# Patient Record
Sex: FEMALE | Race: WHITE | NOT HISPANIC OR LATINO | Employment: OTHER | URBAN - METROPOLITAN AREA
[De-identification: names, ages, dates, MRNs, and addresses within clinical notes are randomized per-mention and may not be internally consistent; named-entity substitution may affect disease eponyms.]

---

## 2023-08-22 ENCOUNTER — HOSPITAL ENCOUNTER (OUTPATIENT)
Dept: DATA CONVERSION | Facility: HOSPITAL | Age: 88
Discharge: HOME | End: 2023-08-22
Payer: MEDICARE

## 2023-08-22 DIAGNOSIS — R82.71 BACTERIURIA: ICD-10-CM

## 2023-08-22 LAB
AMOXICILLIN+CLAV SUSC ISLT: NORMAL
AMPICILLIN SUSC ISLT: NORMAL
AMPICILLIN+SULBAC SUSC ISLT: NORMAL
BACTERIA ISLT: NORMAL
BACTERIA SPEC CULT: NORMAL
CC # UR: NORMAL /UL
CEFAZOLIN SUSC ISLT: NORMAL
CIPROFLOXACIN SUSC ISLT: NORMAL
GENTAMICIN ISLT MLC: NORMAL
LEVOFLOXACIN SUSC ISLT: NORMAL
MEROPENEM SUSC ISLT: NORMAL
MIC (SUSCEPTIBILITY): NORMAL
NITROFURANTOIN SUSC ISLT: NORMAL
PIP+TAZO SUSC ISLT: NORMAL
REPORT STATUS -LH SQ DATA CONVERSION: NORMAL
SERVICE CMNT-IMP: NORMAL
SPECIMEN SOURCE: NORMAL
TETRACYCLINE SUSC ISLT: NORMAL
TMP SMX SUSC ISLT: NORMAL

## 2023-09-01 PROBLEM — G62.9 NEUROPATHY: Status: ACTIVE | Noted: 2023-09-01

## 2023-09-01 PROBLEM — R15.9 FULL INCONTINENCE OF FECES: Status: ACTIVE | Noted: 2023-09-01

## 2023-09-01 PROBLEM — Z85.42 HISTORY OF CANCER OF UTERUS: Status: ACTIVE | Noted: 2023-09-01

## 2023-09-01 PROBLEM — R19.5 OCCULT BLOOD IN STOOLS: Status: ACTIVE | Noted: 2023-09-01

## 2023-09-01 PROBLEM — I48.91 ATRIAL FIBRILLATION (MULTI): Status: ACTIVE | Noted: 2023-09-01

## 2023-09-01 PROBLEM — G25.81 RESTLESS LEGS: Status: ACTIVE | Noted: 2023-09-01
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
## 2023-09-01 PROBLEM — M81.0 AGE-RELATED OSTEOPOROSIS WITHOUT CURRENT PATHOLOGICAL FRACTURE: Status: ACTIVE | Noted: 2023-09-01

## 2023-09-01 PROBLEM — M06.9 RHEUMATOID ARTHRITIS (MULTI): Status: ACTIVE | Noted: 2023-09-01

## 2023-09-01 PROBLEM — M48.061 LUMBAR STENOSIS: Status: ACTIVE | Noted: 2023-09-01

## 2023-09-01 PROBLEM — M35.3 POLYMYALGIA (MULTI): Status: ACTIVE | Noted: 2023-09-01

## 2023-09-01 PROBLEM — I07.1 MODERATE TRICUSPID REGURGITATION: Status: ACTIVE | Noted: 2023-09-01

## 2023-09-01 PROBLEM — N32.81 OAB (OVERACTIVE BLADDER): Status: ACTIVE | Noted: 2023-09-01

## 2023-09-01 PROBLEM — E78.5 HYPERLIPIDEMIA: Status: ACTIVE | Noted: 2023-09-01

## 2023-09-01 PROBLEM — S01.81XA FACIAL LACERATION: Status: ACTIVE | Noted: 2023-09-01

## 2023-09-01 PROBLEM — R10.9 ABDOMINAL PAIN: Status: ACTIVE | Noted: 2023-09-01

## 2023-09-01 PROBLEM — R40.1 CLOUDED CONSCIOUSNESS: Status: ACTIVE | Noted: 2023-09-01

## 2023-09-01 PROBLEM — I11.9 BENIGN HYPERTENSIVE HEART DISEASE: Status: ACTIVE | Noted: 2023-09-01

## 2023-09-01 PROBLEM — K57.90 DIVERTICULAR DISEASE: Status: ACTIVE | Noted: 2023-09-01

## 2023-09-01 PROBLEM — H90.3 SENSORINEURAL HEARING LOSS, BILATERAL: Status: ACTIVE | Noted: 2023-09-01

## 2023-09-01 PROBLEM — M19.90 OSTEOARTHRITIS: Status: ACTIVE | Noted: 2023-09-01

## 2023-09-01 PROBLEM — I34.0 MITRAL VALVE INSUFFICIENCY: Status: ACTIVE | Noted: 2023-09-01

## 2023-09-01 PROBLEM — I10 ESSENTIAL HYPERTENSION: Status: ACTIVE | Noted: 2023-09-01

## 2023-09-01 PROBLEM — K21.9 GERD (GASTROESOPHAGEAL REFLUX DISEASE): Status: ACTIVE | Noted: 2023-09-01

## 2023-09-01 PROBLEM — M81.0 OSTEOPOROSIS: Status: ACTIVE | Noted: 2023-09-01

## 2023-09-01 PROBLEM — F41.9 ANXIETY: Status: ACTIVE | Noted: 2023-09-01

## 2023-09-01 PROBLEM — I42.9 CARDIOMYOPATHY (MULTI): Status: ACTIVE | Noted: 2023-09-01

## 2023-09-01 RX ORDER — FAMOTIDINE 20 MG/1
20 TABLET, FILM COATED ORAL DAILY
COMMUNITY

## 2023-09-01 RX ORDER — HYDROCHLOROTHIAZIDE 25 MG/1
25 TABLET ORAL DAILY
COMMUNITY
Start: 2022-02-15

## 2023-09-01 RX ORDER — DENOSUMAB 60 MG/ML
INJECTION SUBCUTANEOUS
COMMUNITY

## 2023-09-01 RX ORDER — TRAMADOL HYDROCHLORIDE AND ACETAMINOPHEN 37.5; 325 MG/1; MG/1
1.5 TABLET, FILM COATED ORAL EVERY 4 HOURS
COMMUNITY
Start: 2023-04-03

## 2023-09-01 RX ORDER — GUAIFENESIN 1200 MG
650 TABLET, EXTENDED RELEASE 12 HR ORAL EVERY 6 HOURS
COMMUNITY

## 2023-09-01 RX ORDER — LOPERAMIDE HCL 2 MG
2 TABLET ORAL EVERY OTHER DAY
COMMUNITY

## 2023-09-01 RX ORDER — GABAPENTIN 300 MG/1
600 CAPSULE ORAL
COMMUNITY
Start: 2022-02-17

## 2023-09-01 RX ORDER — TRAMADOL HYDROCHLORIDE 50 MG/1
TABLET ORAL
COMMUNITY
End: 2023-11-28 | Stop reason: WASHOUT

## 2023-09-01 RX ORDER — CARVEDILOL 6.25 MG/1
6.25 TABLET ORAL 2 TIMES DAILY
COMMUNITY
Start: 2022-03-03

## 2023-11-15 ENCOUNTER — LAB REQUISITION (OUTPATIENT)
Dept: LAB | Facility: HOSPITAL | Age: 88
End: 2023-11-15
Payer: MEDICARE

## 2023-11-15 DIAGNOSIS — R82.71 BACTERIURIA: ICD-10-CM

## 2023-11-15 PROCEDURE — 87086 URINE CULTURE/COLONY COUNT: CPT

## 2023-11-16 LAB — BACTERIA UR CULT: ABNORMAL

## 2023-11-19 PROBLEM — K59.00 CONSTIPATION: Status: ACTIVE | Noted: 2023-11-19

## 2023-11-19 PROBLEM — I42.0 DILATED CARDIOMYOPATHY (MULTI): Status: ACTIVE | Noted: 2023-11-19

## 2023-11-27 ENCOUNTER — NURSING HOME VISIT (OUTPATIENT)
Dept: POST ACUTE CARE | Facility: EXTERNAL LOCATION | Age: 88
End: 2023-11-27
Payer: MEDICARE

## 2023-11-27 DIAGNOSIS — R29.6 FALL IN ELDERLY PATIENT: ICD-10-CM

## 2023-11-27 DIAGNOSIS — R04.2 COUGH WITH HEMOPTYSIS: Primary | ICD-10-CM

## 2023-11-27 DIAGNOSIS — R06.02 SOB (SHORTNESS OF BREATH) ON EXERTION: ICD-10-CM

## 2023-11-27 PROCEDURE — 99350 HOME/RES VST EST HIGH MDM 60: CPT

## 2023-11-27 ASSESSMENT — PAIN SCALES - GENERAL: PAINLEVEL: 2

## 2023-11-28 VITALS
DIASTOLIC BLOOD PRESSURE: 60 MMHG | HEART RATE: 70 BPM | OXYGEN SATURATION: 96 % | RESPIRATION RATE: 16 BRPM | SYSTOLIC BLOOD PRESSURE: 102 MMHG

## 2023-11-28 PROBLEM — R29.6 FALL IN ELDERLY PATIENT: Status: ACTIVE | Noted: 2023-11-28

## 2023-11-28 RX ORDER — POLYETHYLENE GLYCOL 3350 17 G/17G
17 POWDER, FOR SOLUTION ORAL DAILY PRN
COMMUNITY

## 2023-11-28 RX ORDER — DEXTROMETHORPHAN HYDROBROMIDE, GUAIFENESIN 20; 400 MG/20ML; MG/20ML
10 SOLUTION ORAL EVERY 4 HOURS PRN
COMMUNITY

## 2023-11-28 RX ORDER — LOSARTAN POTASSIUM 25 MG/1
25 TABLET ORAL DAILY
COMMUNITY
Start: 2023-08-29

## 2023-11-28 ASSESSMENT — ENCOUNTER SYMPTOMS
SHORTNESS OF BREATH: 1
DIZZINESS: 0
NECK STIFFNESS: 0
APPETITE CHANGE: 1
DIFFICULTY URINATING: 0
PALPITATIONS: 0
CHEST TIGHTNESS: 0
HEMATURIA: 0
AGITATION: 0
VOMITING: 0
FATIGUE: 1
HEADACHES: 0
NAUSEA: 0
COUGH: 1
CONFUSION: 0
RHINORRHEA: 0
SLEEP DISTURBANCE: 0
CONSTIPATION: 0
ARTHRALGIAS: 1
BLOOD IN STOOL: 0
NERVOUS/ANXIOUS: 0
LIGHT-HEADEDNESS: 0
FEVER: 0

## 2023-11-29 NOTE — PROGRESS NOTES
"Subjective   Patient ID: Brittany Santana is a 89 y.o. female who is assisted living/ home patient being seen and evaluated for reports of  a fall and \"coughing up blood\".     HPI   Pt visited in apartment, up in recliner, oriented x3, comfortable and denies pain at this time. Pt states she had a fall Friday after getting up from chair, lost balance, and fell into books she had against the wall. Pt states \" I hurt my right shoulder, I have a bruise, and my backside hurts\". Pt admits ROM is at baseline. Denies hitting head, headaches, dizziness, nausea/vomiting, and changes in vision. Pt ambulating without difficulty.     Pt states \"I also began coughing up blood this weekend. The last time was last night, I have the kleenex on my nightstand\". Pt with pink tinged sputum with swirls of scant blood in white kleenex\". Pt admits she has been feeling more fatigued lately, and increased sob on exertion that began a week or so ago. Pt states she has begun infusions for her RA. Last one was 11/6, and has another next week. Pt thinks it is methotrexate, but not sure if that's the infusion medication or not. Pt states \"it's what my daughter said it is, she took me\". Pt denies chest pain, palpitations, sob at rest, but states \"I feel different\". Pt denies abdominal pain and cramping. Pt denies blood in stool or urine. Pt is not taking aspirin or nsaids. Pt administers her own medication. Pt admits to not taking losartan daily that was prescribed by cardiologist. Pt states \"I take it sometimes\". Pt is not interested in going to hospital for further evaluation. Pt states \"I'm ready to go when it's my time, I'm 89, and I feel old\". Pt in agreement to basic labs and chest xray to r/o pulmonary edema, pneumonia, or any other significant findings. Pt will notify nursing if symptoms worsen. TC to son Zay and updated on visit who is in agreement with plan, and conservative treatment if xray demonstrates any significance.     Review " of Systems   Constitutional:  Positive for appetite change and fatigue. Negative for fever.   HENT:  Positive for congestion. Negative for rhinorrhea.    Eyes:  Negative for visual disturbance.   Respiratory:  Positive for cough and shortness of breath. Negative for chest tightness.    Cardiovascular:  Positive for leg swelling. Negative for chest pain and palpitations.   Gastrointestinal:  Negative for blood in stool, constipation, nausea and vomiting.   Genitourinary:  Negative for difficulty urinating and hematuria.   Musculoskeletal:  Positive for arthralgias. Negative for neck stiffness.   Neurological:  Negative for dizziness, light-headedness and headaches.   Psychiatric/Behavioral:  Negative for agitation, confusion and sleep disturbance. The patient is not nervous/anxious.        Objective   /60 (BP Location: Left arm, Patient Position: Sitting, BP Cuff Size: Adult)   Pulse 70   Resp 16   SpO2 96%     Physical Exam  Constitutional:       General: She is awake.      Appearance: She is underweight. She is not ill-appearing.   HENT:      Head: Normocephalic.      Nose: Nose normal.      Mouth/Throat:      Mouth: Mucous membranes are moist.   Eyes:      Conjunctiva/sclera: Conjunctivae normal.      Pupils: Pupils are equal, round, and reactive to light.   Cardiovascular:      Rate and Rhythm: Normal rate and regular rhythm.      Pulses: Normal pulses.      Heart sounds: Normal heart sounds.   Pulmonary:      Breath sounds: Examination of the left-upper field reveals decreased breath sounds and rales. Examination of the left-middle field reveals decreased breath sounds and rales. Examination of the left-lower field reveals decreased breath sounds and rales. Decreased breath sounds and rales present.   Abdominal:      General: Bowel sounds are normal.   Musculoskeletal:         General: Normal range of motion.      Cervical back: Normal range of motion.      Right lower leg: No edema.      Left lower  leg: Edema present.      Comments: ROM at baseline   Skin:     General: Skin is warm.      Capillary Refill: Capillary refill takes less than 2 seconds.      Findings: Bruising present.      Comments: eccymosis to right shoulder   Neurological:      General: No focal deficit present.      Mental Status: She is alert and oriented to person, place, and time. Mental status is at baseline.   Psychiatric:         Mood and Affect: Mood normal.         Behavior: Behavior normal. Behavior is cooperative.         Thought Content: Thought content normal.         Judgment: Judgment normal.         Assessment/Plan   Diagnoses and all orders for this visit:  Cough with hemoptysis  Comments:  Obtain chest xray 2 view. Obtain BMP and CBC.  SOB (shortness of breath) on exertion  Comments:  Chest xray ordered at facility.  Fall in elderly patient  Comments:  Pt to use call pendant if feeling unsteady. Pt to clean clutter from chairside. .

## 2023-12-04 ENCOUNTER — NURSING HOME VISIT (OUTPATIENT)
Dept: POST ACUTE CARE | Facility: EXTERNAL LOCATION | Age: 88
End: 2023-12-04
Payer: MEDICARE

## 2023-12-04 DIAGNOSIS — J90 PLEURAL EFFUSION ON LEFT: Primary | ICD-10-CM

## 2023-12-04 DIAGNOSIS — R53.83 FATIGUE, UNSPECIFIED TYPE: ICD-10-CM

## 2023-12-04 PROCEDURE — 99349 HOME/RES VST EST MOD MDM 40: CPT

## 2023-12-04 ASSESSMENT — PAIN SCALES - GENERAL: PAINLEVEL: 3

## 2023-12-05 ENCOUNTER — NURSING HOME VISIT (OUTPATIENT)
Dept: POST ACUTE CARE | Facility: EXTERNAL LOCATION | Age: 88
End: 2023-12-05
Payer: MEDICARE

## 2023-12-05 ENCOUNTER — HOSPITAL ENCOUNTER (EMERGENCY)
Facility: HOSPITAL | Age: 88
Discharge: HOME | End: 2023-12-05
Attending: EMERGENCY MEDICINE
Payer: MEDICARE

## 2023-12-05 ENCOUNTER — APPOINTMENT (OUTPATIENT)
Dept: RADIOLOGY | Facility: HOSPITAL | Age: 88
End: 2023-12-05
Payer: MEDICARE

## 2023-12-05 VITALS — RESPIRATION RATE: 16 BRPM | HEART RATE: 63 BPM | OXYGEN SATURATION: 93 %

## 2023-12-05 VITALS
RESPIRATION RATE: 20 BRPM | HEART RATE: 54 BPM | SYSTOLIC BLOOD PRESSURE: 70 MMHG | OXYGEN SATURATION: 94 % | DIASTOLIC BLOOD PRESSURE: 55 MMHG

## 2023-12-05 VITALS
RESPIRATION RATE: 17 BRPM | OXYGEN SATURATION: 95 % | HEIGHT: 60 IN | BODY MASS INDEX: 19.17 KG/M2 | SYSTOLIC BLOOD PRESSURE: 139 MMHG | WEIGHT: 97.66 LBS | TEMPERATURE: 98.1 F | HEART RATE: 63 BPM | DIASTOLIC BLOOD PRESSURE: 64 MMHG

## 2023-12-05 DIAGNOSIS — N30.01 ACUTE CYSTITIS WITH HEMATURIA: Primary | ICD-10-CM

## 2023-12-05 DIAGNOSIS — R06.02 SHORTNESS OF BREATH: ICD-10-CM

## 2023-12-05 DIAGNOSIS — R91.8 LUNG MASS: ICD-10-CM

## 2023-12-05 DIAGNOSIS — I95.9 HYPOTENSION, UNSPECIFIED HYPOTENSION TYPE: Primary | ICD-10-CM

## 2023-12-05 DIAGNOSIS — R04.2 HEMOPTYSIS: ICD-10-CM

## 2023-12-05 DIAGNOSIS — R53.1 WEAKNESS GENERALIZED: ICD-10-CM

## 2023-12-05 DIAGNOSIS — R07.9 CHEST PAIN, UNSPECIFIED TYPE: ICD-10-CM

## 2023-12-05 DIAGNOSIS — D64.9 ANEMIA, UNSPECIFIED TYPE: ICD-10-CM

## 2023-12-05 DIAGNOSIS — R04.2 COUGH WITH HEMOPTYSIS: ICD-10-CM

## 2023-12-05 DIAGNOSIS — R05.1 ACUTE COUGH: ICD-10-CM

## 2023-12-05 LAB
ALBUMIN SERPL-MCNC: 3.1 G/DL (ref 3.5–5)
ALP BLD-CCNC: 91 U/L (ref 35–125)
ALT SERPL-CCNC: 9 U/L (ref 5–40)
ANION GAP SERPL CALC-SCNC: 11 MMOL/L
APPEARANCE UR: ABNORMAL
AST SERPL-CCNC: 14 U/L (ref 5–40)
BACTERIA #/AREA URNS AUTO: ABNORMAL /HPF
BASOPHILS # BLD AUTO: 0.03 X10*3/UL (ref 0–0.1)
BASOPHILS NFR BLD AUTO: 0.3 %
BILIRUB SERPL-MCNC: 0.3 MG/DL (ref 0.1–1.2)
BILIRUB UR STRIP.AUTO-MCNC: NEGATIVE MG/DL
BUN SERPL-MCNC: 34 MG/DL (ref 8–25)
CALCIUM SERPL-MCNC: 9.5 MG/DL (ref 8.5–10.4)
CHLORIDE SERPL-SCNC: 95 MMOL/L (ref 97–107)
CO2 SERPL-SCNC: 30 MMOL/L (ref 24–31)
COLOR UR: ABNORMAL
CREAT SERPL-MCNC: 1.1 MG/DL (ref 0.4–1.6)
EOSINOPHIL # BLD AUTO: 0.27 X10*3/UL (ref 0–0.4)
EOSINOPHIL NFR BLD AUTO: 2.5 %
ERYTHROCYTE [DISTWIDTH] IN BLOOD BY AUTOMATED COUNT: 14.4 % (ref 11.5–14.5)
GFR SERPL CREATININE-BSD FRML MDRD: 47 ML/MIN/1.73M*2
GLUCOSE SERPL-MCNC: 173 MG/DL (ref 65–99)
GLUCOSE UR STRIP.AUTO-MCNC: NORMAL MG/DL
HCT VFR BLD AUTO: 35.7 % (ref 36–46)
HGB BLD-MCNC: 11.6 G/DL (ref 12–16)
IMM GRANULOCYTES # BLD AUTO: 0.04 X10*3/UL (ref 0–0.5)
IMM GRANULOCYTES NFR BLD AUTO: 0.4 % (ref 0–0.9)
INR PPP: 1.1 (ref 0.9–1.2)
KETONES UR STRIP.AUTO-MCNC: NEGATIVE MG/DL
LEUKOCYTE ESTERASE UR QL STRIP.AUTO: ABNORMAL
LIPASE SERPL-CCNC: 18 U/L (ref 16–63)
LYMPHOCYTES # BLD AUTO: 0.9 X10*3/UL (ref 0.8–3)
LYMPHOCYTES NFR BLD AUTO: 8.2 %
MAGNESIUM SERPL-MCNC: 2 MG/DL (ref 1.6–3.1)
MCH RBC QN AUTO: 31.2 PG (ref 26–34)
MCHC RBC AUTO-ENTMCNC: 32.5 G/DL (ref 32–36)
MCV RBC AUTO: 96 FL (ref 80–100)
MONOCYTES # BLD AUTO: 0.74 X10*3/UL (ref 0.05–0.8)
MONOCYTES NFR BLD AUTO: 6.7 %
MUCOUS THREADS #/AREA URNS AUTO: ABNORMAL /LPF
NEUTROPHILS # BLD AUTO: 8.99 X10*3/UL (ref 1.6–5.5)
NEUTROPHILS NFR BLD AUTO: 81.9 %
NITRITE UR QL STRIP.AUTO: NEGATIVE
NRBC BLD-RTO: 0 /100 WBCS (ref 0–0)
NT-PROBNP SERPL-MCNC: 4319 PG/ML (ref 0–624)
PH UR STRIP.AUTO: 8 [PH]
PLATELET # BLD AUTO: 281 X10*3/UL (ref 150–450)
POTASSIUM SERPL-SCNC: 3.8 MMOL/L (ref 3.4–5.1)
PROT SERPL-MCNC: 6.7 G/DL (ref 5.9–7.9)
PROT UR STRIP.AUTO-MCNC: ABNORMAL MG/DL
PROTHROMBIN TIME: 11.6 SECONDS (ref 9.3–12.7)
RBC # BLD AUTO: 3.72 X10*6/UL (ref 4–5.2)
RBC # UR STRIP.AUTO: ABNORMAL /UL
RBC #/AREA URNS AUTO: ABNORMAL /HPF
SARS-COV-2 RNA RESP QL NAA+PROBE: NOT DETECTED
SODIUM SERPL-SCNC: 136 MMOL/L (ref 133–145)
SP GR UR STRIP.AUTO: 1.05
SQUAMOUS #/AREA URNS AUTO: ABNORMAL /HPF
TROPONIN T SERPL-MCNC: 51 NG/L
TROPONIN T SERPL-MCNC: 56 NG/L
UROBILINOGEN UR STRIP.AUTO-MCNC: ABNORMAL MG/DL
WBC # BLD AUTO: 11 X10*3/UL (ref 4.4–11.3)
WBC #/AREA URNS AUTO: ABNORMAL /HPF

## 2023-12-05 PROCEDURE — 81001 URINALYSIS AUTO W/SCOPE: CPT | Performed by: CLINICAL NURSE SPECIALIST

## 2023-12-05 PROCEDURE — 36415 COLL VENOUS BLD VENIPUNCTURE: CPT | Performed by: CLINICAL NURSE SPECIALIST

## 2023-12-05 PROCEDURE — 87635 SARS-COV-2 COVID-19 AMP PRB: CPT | Performed by: CLINICAL NURSE SPECIALIST

## 2023-12-05 PROCEDURE — 80053 COMPREHEN METABOLIC PANEL: CPT | Performed by: CLINICAL NURSE SPECIALIST

## 2023-12-05 PROCEDURE — 84484 ASSAY OF TROPONIN QUANT: CPT | Performed by: CLINICAL NURSE SPECIALIST

## 2023-12-05 PROCEDURE — 83690 ASSAY OF LIPASE: CPT | Performed by: CLINICAL NURSE SPECIALIST

## 2023-12-05 PROCEDURE — 83735 ASSAY OF MAGNESIUM: CPT | Performed by: CLINICAL NURSE SPECIALIST

## 2023-12-05 PROCEDURE — 2550000001 HC RX 255 CONTRASTS: Performed by: EMERGENCY MEDICINE

## 2023-12-05 PROCEDURE — 99285 EMERGENCY DEPT VISIT HI MDM: CPT | Mod: 25

## 2023-12-05 PROCEDURE — 96372 THER/PROPH/DIAG INJ SC/IM: CPT | Mod: 59

## 2023-12-05 PROCEDURE — 2500000004 HC RX 250 GENERAL PHARMACY W/ HCPCS (ALT 636 FOR OP/ED): Performed by: CLINICAL NURSE SPECIALIST

## 2023-12-05 PROCEDURE — 85025 COMPLETE CBC W/AUTO DIFF WBC: CPT | Performed by: CLINICAL NURSE SPECIALIST

## 2023-12-05 PROCEDURE — 2550000001 HC RX 255 CONTRASTS: Performed by: CLINICAL NURSE SPECIALIST

## 2023-12-05 PROCEDURE — 85610 PROTHROMBIN TIME: CPT | Performed by: CLINICAL NURSE SPECIALIST

## 2023-12-05 PROCEDURE — 83880 ASSAY OF NATRIURETIC PEPTIDE: CPT | Performed by: CLINICAL NURSE SPECIALIST

## 2023-12-05 PROCEDURE — 87186 SC STD MICRODIL/AGAR DIL: CPT | Mod: TRILAB | Performed by: CLINICAL NURSE SPECIALIST

## 2023-12-05 PROCEDURE — 2500000005 HC RX 250 GENERAL PHARMACY W/O HCPCS: Performed by: CLINICAL NURSE SPECIALIST

## 2023-12-05 PROCEDURE — 71275 CT ANGIOGRAPHY CHEST: CPT

## 2023-12-05 PROCEDURE — 99349 HOME/RES VST EST MOD MDM 40: CPT

## 2023-12-05 PROCEDURE — 87086 URINE CULTURE/COLONY COUNT: CPT | Performed by: CLINICAL NURSE SPECIALIST

## 2023-12-05 RX ORDER — BENZONATATE 100 MG/1
100 CAPSULE ORAL 3 TIMES DAILY PRN
Qty: 30 CAPSULE | Refills: 0 | Status: SHIPPED | OUTPATIENT
Start: 2023-12-05

## 2023-12-05 RX ORDER — CEFTRIAXONE 1 G/1
1 INJECTION, POWDER, FOR SOLUTION INTRAMUSCULAR; INTRAVENOUS ONCE
Status: COMPLETED | OUTPATIENT
Start: 2023-12-05 | End: 2023-12-05

## 2023-12-05 RX ORDER — ACETAMINOPHEN 325 MG/1
650 TABLET ORAL ONCE
Status: COMPLETED | OUTPATIENT
Start: 2023-12-05 | End: 2023-12-05

## 2023-12-05 RX ORDER — CEPHALEXIN 500 MG/1
500 CAPSULE ORAL 3 TIMES DAILY
Qty: 21 CAPSULE | Refills: 0 | Status: SHIPPED | OUTPATIENT
Start: 2023-12-05 | End: 2023-12-12

## 2023-12-05 RX ORDER — CEFTRIAXONE 1 G/50ML
1 INJECTION, SOLUTION INTRAVENOUS ONCE
Status: DISCONTINUED | OUTPATIENT
Start: 2023-12-05 | End: 2023-12-05

## 2023-12-05 RX ORDER — LIDOCAINE HYDROCHLORIDE 10 MG/ML
5 INJECTION INFILTRATION; PERINEURAL ONCE
Status: COMPLETED | OUTPATIENT
Start: 2023-12-05 | End: 2023-12-05

## 2023-12-05 RX ADMIN — LIDOCAINE HYDROCHLORIDE 50 MG: 10 INJECTION, SOLUTION INFILTRATION; PERINEURAL at 20:23

## 2023-12-05 RX ADMIN — IOHEXOL 75 ML: 350 INJECTION, SOLUTION INTRAVENOUS at 15:48

## 2023-12-05 RX ADMIN — CEFTRIAXONE SODIUM 1 G: 1 INJECTION, POWDER, FOR SOLUTION INTRAMUSCULAR; INTRAVENOUS at 20:13

## 2023-12-05 RX ADMIN — ACETAMINOPHEN 650 MG: 325 TABLET ORAL at 15:58

## 2023-12-05 ASSESSMENT — ENCOUNTER SYMPTOMS
FATIGUE: 1
RHINORRHEA: 0
SLEEP DISTURBANCE: 0
AGITATION: 0
HEMATURIA: 0
BLOOD IN STOOL: 0
NECK STIFFNESS: 1
PALPITATIONS: 0
ARTHRALGIAS: 1
HEMATURIA: 0
LIGHT-HEADEDNESS: 0
APPETITE CHANGE: 0
NAUSEA: 0
CHEST TIGHTNESS: 0
CONFUSION: 0
AGITATION: 0
VOMITING: 0
NECK STIFFNESS: 0
RHINORRHEA: 0
LIGHT-HEADEDNESS: 1
APPETITE CHANGE: 0
CHEST TIGHTNESS: 0
NAUSEA: 0
FEVER: 0
DIFFICULTY URINATING: 0
CONFUSION: 0
FEVER: 0
DIFFICULTY URINATING: 0
BLOOD IN STOOL: 0
PHOTOPHOBIA: 0
PALPITATIONS: 0
SLEEP DISTURBANCE: 0
DIZZINESS: 0
WEAKNESS: 1
COUGH: 0
VOMITING: 0
BACK PAIN: 1
NERVOUS/ANXIOUS: 0
SHORTNESS OF BREATH: 1
FATIGUE: 1
ARTHRALGIAS: 1
CONSTIPATION: 0
CONSTIPATION: 0
COUGH: 1
SHORTNESS OF BREATH: 0
HEADACHES: 1
HEADACHES: 0
NERVOUS/ANXIOUS: 1

## 2023-12-05 ASSESSMENT — COLUMBIA-SUICIDE SEVERITY RATING SCALE - C-SSRS
6. HAVE YOU EVER DONE ANYTHING, STARTED TO DO ANYTHING, OR PREPARED TO DO ANYTHING TO END YOUR LIFE?: NO
2. HAVE YOU ACTUALLY HAD ANY THOUGHTS OF KILLING YOURSELF?: NO
1. IN THE PAST MONTH, HAVE YOU WISHED YOU WERE DEAD OR WISHED YOU COULD GO TO SLEEP AND NOT WAKE UP?: NO

## 2023-12-05 ASSESSMENT — PAIN SCALES - GENERAL
PAINLEVEL_OUTOF10: 0 - NO PAIN
PAINLEVEL: 5
PAINLEVEL_OUTOF10: 5 - MODERATE PAIN
PAINLEVEL_OUTOF10: 4

## 2023-12-05 ASSESSMENT — PAIN - FUNCTIONAL ASSESSMENT
PAIN_FUNCTIONAL_ASSESSMENT: 0-10

## 2023-12-05 ASSESSMENT — PAIN DESCRIPTION - PROGRESSION: CLINICAL_PROGRESSION: GRADUALLY IMPROVING

## 2023-12-05 ASSESSMENT — PAIN DESCRIPTION - LOCATION: LOCATION: NECK

## 2023-12-05 NOTE — PROGRESS NOTES
"Subjective   Patient ID: Brittany Santana is a 89 y.o. female who being seen at [facility] after complaints of \"not feeling myself and sob\".     HPI   Visited pt in apartment at [facility] with complaints of sob at rest, coughing up blood, and chest pain. Pt oriented x3, up in recliner, appears uncomfortable and anxious. Covid test negative yesterday, nurse to repeat. Pt ill appearing. Pt states \"my chest hurts, and back hurt, I felt fine today, and now I'm feeling short of breath. It started after breakfast today\". Pt had toast and coffee. Pt unable to describe chest pain, and states \"it feels like someone is bumping me on my chest and back\". Pt states \"I don't get it I was having a good day yesterday, and today I was coughing up blood, and have mucous in the back of my throat. I feel weak\". Pt cancelled infusion treatment yesterday with rheumatologist. Pt admits to chills earlier, and was using a heating pad to warm up. Pt denies fever, n/v,  admits to headache that started recently, unable to describe. Pt admits to feeling light headed. Pt states \"I took my heart medicine today\". Pt took carvedilol and hydrochlorothiazide today after breakfast as directed. Pt self administers medications. Pt with hypotension when assessed. Pt admits to sob at rest and exertion. Pt denies abdominal pain, constipation and voiding symptoms. Pt refusing to go to ER for evaluation, pt states \"they will want to keep me and I don't want to stay\". Explained I could not let her sit  in apartment in distress. Pt states \"if I go and want to come back here, can I?\". Explained to pt that will be her choice if she doesn't want invasive interventions done, and to let the provider know in ER. Assisted pt to bathroom, who insisted on ambulating with walker vs wheelchair. Facility nurse updated. Spoke to daughter Ajit who called, updated on visit, and daughter in agreement to send pt to ER for evaluation and will meet patient at ER. "     Review of Systems   Constitutional:  Positive for fatigue. Negative for appetite change and fever.   HENT:  Positive for congestion. Negative for rhinorrhea.         Pt with reports of coughing up blood today, and mucous in back of throat.    Eyes:  Negative for visual disturbance.   Respiratory:  Positive for cough and shortness of breath. Negative for chest tightness.    Cardiovascular:  Positive for chest pain. Negative for palpitations and leg swelling.   Gastrointestinal:  Negative for blood in stool, constipation, nausea and vomiting.   Genitourinary:  Negative for difficulty urinating and hematuria.   Musculoskeletal:  Positive for arthralgias, back pain and neck stiffness.   Skin:  Positive for pallor.   Neurological:  Positive for weakness, light-headedness and headaches.   Psychiatric/Behavioral:  Negative for agitation, confusion and sleep disturbance. The patient is nervous/anxious.        Objective   BP 70/55 (BP Location: Left arm, Patient Position: Sitting, BP Cuff Size: Adult)   Pulse 54   Resp 20   SpO2 94%  BP right arm 72/52.     Physical Exam  Constitutional:       General: She is awake.      Appearance: She is underweight. She is ill-appearing.   HENT:      Head: Normocephalic.      Comments: Dark circles under bilateral eyes     Nose: Nose normal.      Mouth/Throat:      Mouth: Mucous membranes are moist.      Comments: Pt states she feels mucous in her throat.   Eyes:      Conjunctiva/sclera: Conjunctivae normal.      Pupils: Pupils are equal, round, and reactive to light.      Comments: Wears glasses   Cardiovascular:      Rate and Rhythm: Normal rate and regular rhythm.      Pulses: Normal pulses.      Heart sounds: Normal heart sounds.   Pulmonary:      Breath sounds: Normal breath sounds. No decreased breath sounds, wheezing, rhonchi or rales.      Comments: labored  Chest:      Chest wall: Tenderness present.   Abdominal:      General: Bowel sounds are normal.   Musculoskeletal:          General: Normal range of motion.      Cervical back: Normal range of motion.      Right lower leg: No edema.      Left lower leg: No edema.      Comments: ROM at baseline   Skin:     General: Skin is warm.      Capillary Refill: Capillary refill takes less than 2 seconds.      Findings: Bruising present.      Comments: eccymosis to right shoulder   Neurological:      General: No focal deficit present.      Mental Status: She is alert and oriented to person, place, and time. Mental status is at baseline.   Psychiatric:         Mood and Affect: Mood normal.         Behavior: Behavior normal. Behavior is cooperative.         Thought Content: Thought content normal.         Judgment: Judgment normal.         Assessment/Plan   Diagnoses and all orders for this visit:  Hypotension, unspecified hypotension type  Comments:  Send to ER to eval and treat.  Shortness of breath  Chest pain, unspecified type  Cough with hemoptysis  Weakness generalized

## 2023-12-05 NOTE — PROGRESS NOTES
"Subjective   Patient ID: Brittany Santana is a 89 y.o. female who resides at [facility] is being seen for a follow up after xray demonstrated left upper lob infiltrate, small left sided pleural effusion. Cardiomegaly. No CHF.     HPI   Pt visited in apartment, up in dining room chair eating lunch, denies pain. Pt admits to feeling better after increasing hydrochlorothiazide from 25mg once daily to twice daily x 5 days. Last dose increase was yesterday. Pt states \"I don't feel sob anymore, and can walk to my recliner using my walker without feeling winded\". Pt states she last cough up pink mucous two days ago.  Pt appetite remains good, drinking cranberry juice. Pt states \"my shoulder is the only thing that is sore from my fall a week ago\". Pt taking tylenol for pain which is effective. Pt denies fever, chills, headache, dizziness, congestion and runny nose. Pt denies chest pain and sob at rest and exertion. Pt denies constipation and voiding symptoms. Pt states \"I just feel tired\". Reviewed recent lab work from facility with patient that were obtained last week. Pt is at baseline after reviewing CMP. Per CBC RBC, HGB, hematocrit slightly decreased from August. RBC 3.42 from 3.72, HGB 10.8 from 11.6, hematocrit 32.5 from 35.5. Pt without signs/symptoms of active bleeding. Recommended iron three times weekly to see if helps with fatigue. Facility did Covid test today, and was negative.     No current facility-administered medications on file prior to visit.     Current Outpatient Medications on File Prior to Visit   Medication Sig Dispense Refill    acetaminophen (TylenoL) 325 mg capsule Take 2 capsules (650 mg) by mouth every 6 hours.      calcium carb,gluc/mag ox,gluc (CALCIUM MAGNESIUM ORAL) Take 1 tablet by mouth once daily.      carvedilol (Coreg) 6.25 mg tablet Take 1 tablet (6.25 mg) by mouth 2 times a day.      denosumab (Prolia) 60 mg/mL syringe Inject under the skin.      dextromethorphan-guaifenesin " (Robitussin Cough-Chest Luis DM) 5-100 mg/5 mL liquid Take 10 mL by mouth every 4 hours if needed (cough/congestion).      famotidine (Pepcid) 20 mg tablet Take 1 tablet (20 mg) by mouth once daily.      folic acid/multivit-min/lutein (CENTRUM SILVER ORAL) Take 1 tablet by mouth once daily.      gabapentin (Neurontin) 300 mg capsule Take 2 capsules (600 mg) by mouth. at night 1 tab in the am Orally twice a day      hydroCHLOROthiazide (HYDRODiuril) 25 mg tablet Take 1 tablet (25 mg) by mouth once daily.      loperamide (Imodium A-D) 2 mg tablet Take 1 tablet (2 mg) by mouth every other day.      loperamide HCl/simethicone (LOPERAMIDE-SIMETHICONE ORAL) Take by mouth.      losartan (Cozaar) 25 mg tablet Take 1 tablet (25 mg) by mouth once daily.      polyethylene glycol (Miralax) 17 gram/dose powder Take 17 g by mouth once daily as needed (constipation).      psyllium husk (METAMUCIL ORAL) Take by mouth.      traMADoL-acetaminophen (UltraCET) 37.5-325 mg tablet Take 1.5 tablets by mouth every 4 hours.        Review of Systems   Constitutional:  Positive for fatigue. Negative for appetite change and fever.   HENT:  Negative for congestion and rhinorrhea.    Eyes:  Negative for photophobia and visual disturbance.   Respiratory:  Negative for cough, chest tightness and shortness of breath.    Cardiovascular:  Negative for chest pain, palpitations and leg swelling.   Gastrointestinal:  Negative for blood in stool, constipation, nausea and vomiting.   Genitourinary:  Negative for difficulty urinating and hematuria.   Musculoskeletal:  Positive for arthralgias. Negative for neck stiffness.   Neurological:  Negative for dizziness, light-headedness and headaches.   Psychiatric/Behavioral:  Negative for agitation, confusion and sleep disturbance. The patient is not nervous/anxious.        Objective   Pulse 63   Resp 16   SpO2 93%     Physical Exam  Constitutional:       General: She is awake.      Appearance: She is  underweight. She is not ill-appearing.   HENT:      Head: Normocephalic.      Nose: Nose normal.      Mouth/Throat:      Mouth: Mucous membranes are moist.   Eyes:      Conjunctiva/sclera: Conjunctivae normal.      Pupils: Pupils are equal, round, and reactive to light.   Cardiovascular:      Rate and Rhythm: Normal rate and regular rhythm.      Pulses: Normal pulses.      Heart sounds: Normal heart sounds.   Pulmonary:      Breath sounds: No decreased breath sounds or rales.   Abdominal:      General: Bowel sounds are normal.   Musculoskeletal:         General: Normal range of motion.      Cervical back: Normal range of motion.      Right lower leg: No edema.      Left lower leg: No edema.      Comments: ROM at baseline   Skin:     General: Skin is warm.      Capillary Refill: Capillary refill takes less than 2 seconds.      Findings: Bruising present.      Comments: eccymosis to right shoulder   Neurological:      General: No focal deficit present.      Mental Status: She is alert and oriented to person, place, and time. Mental status is at baseline.   Psychiatric:         Mood and Affect: Mood normal.         Behavior: Behavior normal. Behavior is cooperative.         Thought Content: Thought content normal.         Judgment: Judgment normal.         Assessment/Plan   Diagnoses and all orders for this visit:  Pleural effusion on left  Comments:  No SOB, lungs clear today. Start taking hydrochlorothiazide 25mg as directed po once daily.  Fatigue, unspecified type  Comments:  Daugter to bring in ferrous sulftate 325mg (65fe) for pt to begin po three times weekly, and then will add to medication list/orders once brought in.

## 2023-12-05 NOTE — ED PROVIDER NOTES
"Department of Emergency Medicine   ED  Provider Note  Admit Date/RoomTime: 12/5/2023  1:52 PM  ED Room: 18/[facility]        History of Present Illness:  Chief Complaint   Patient presents with    Chest Pain     Pt states \"I was having horrible pressure on the left side of my chest this morning and shortness of breath, now it is not really there. I was spitting up some dark red blood as well before and I did this morning. \" Pt denies N/V/fever/chills.         Brittany Santana is a 89-year-old female with history of atrial fibs diabetes, hematuria, uterine cancer, osteoporosis, from [facility] presented to the emergency department for complaints of chest painpresenting to the ED for cough shortness of breath chest pain.  Patient reports Thanksgiving she was bent down to put something on a chair and had this episode of dizziness where she felt like she had a back to her chair causing her to fall.  Hurting her arm.,  Patient reports couple of days ago she was coughing hard and coughed up mucus tinged with blood.  Then it went away and she had another episode today.  Denies any increased pain  Review of Systems:   Pertinent positives and negatives are stated within HPI, all other systems reviewed and are negative.        --------------------------------------------- PAST HISTORY ---------------------------------------------  Past Medical History:  has a past medical history of Atrial fibrillation (CMS/[facility]), Chronic UTI, Diabetes mellitus, type II (CMS/[facility]), Discontinued smoking (11/2005), Diverticulosis, DJD (degenerative joint disease), Esophageal reflux, Glucose intolerance, Hematuria, Hemorrhagic cystitis, History of cystocele, History of nuclear stress test (02/2017), Hyperlipidemia, Osteoporosis, Personal history of malignant neoplasm of other parts of uterus, Personal history of other specified conditions, PMR (polymyalgia rheumatica) (CMS/[facility]), Radiculopathy, Restless leg syndrome, Rheumatoid arthritis " (CMS/HCC), Sensorineural hearing loss, bilateral, Spinal stenosis, Steroid-induced diabetes mellitus (CMS/HCC), and Uterine cancer (CMS/HCC).  Past Surgical History:  has a past surgical history that includes Other surgical history; Other surgical history; Vein ligation (1956); Shoulder arthroscopy (Right, 1992); Hysterectomy (1998); Shoulder surgery (Left, 1998); Ankle fracture surgery (2008); Total hip arthroplasty (Right, 07/2013); Cataract extraction (Right, 06/02/2015); Cataract extraction (Left, 10/2015); Cystoscopy (07/2015); Laparoscopy repair hiatal hernia (11/14/2017); Other surgical history (05/30/2019); and Hand surgery (Right, 09/2019).  Social History:  reports that she has quit smoking. Her smoking use included cigarettes. She has never used smokeless tobacco. She reports that she does not currently use alcohol. She reports that she does not use drugs.  Family History: family history includes Blood clot in her sister; Diabetes in her father and sister; Fibromyalgia in her daughter; Heart attack in her father; Heart disease in her brother, father, and mother; Heart failure in her mother; Hypertension in her daughter; Lung cancer in her sister; black lung in her father; intracranial bleed in her sister; poor inmune system in her daughter; pre diabetic in her daughter.. Unless otherwise noted, family history is non contributory  The patient’s home medications have been reviewed.  Allergies: Doxycycline, Nitrofurantoin monohyd/m-cryst, Sulfamethoxazole-trimethoprim, Meloxicam, Simvastatin, Sulfa (sulfonamide antibiotics), Trimethoprim, Ciprofloxacin, Codeine, and Hydroxychloroquine        ---------------------------------------------------PHYSICAL EXAM--------------------------------------    GENERAL APPEARANCE: Awake and alert.  Frail  VITAL SIGNS: As per the nurses' triage record.   HEENT: Normocephalic, atraumatic. Extraocular muscles are intact. Pupils equal round and reactive to light. Conjunctiva  are pink. Negative scleral icterus. Mucous membranes are moist. Tongue in the midline. Pharynx was without erythema or exudates, uvula midline  NECK: Soft Nontender and supple, full gross ROM, no meningeal signs.  CHEST: Nontender to palpation. Clear to auscultation bilaterally. No rales, rhonchi, or wheezing.  Diminished  HEART: Sinus tachycardia with S1, S2. Regular rate and rhythm. No murmurs, gallops or rubs.  Strong and equal pulses in the extremities.   ABDOMEN: Soft, nontender, nondistended, positive bowel sounds, no palpable masses.  MUSCULCSKELETAL: The calves are nontender to palpation. Full gross active range of motion. Ambulating on own with no acute difficulties  NEUROLOGICAL: Awake, alert and oriented x 3. Power intact in the upper and lower extremities. Sensation is intact to light touch in the upper and lower extremities.   IMMUNOLOGICAL: No lymphatic streaking noted   DERM: No petechiae, rashes, or ecchymoses.          ------------------------- NURSING NOTES AND VITALS REVIEWED ---------------------------  The nursing notes within the ED encounter and vital signs as below have been reviewed by myself  /64 (BP Location: Left arm, Patient Position: Sitting)   Pulse 63   Temp 36.7 °C (98.1 °F) (Oral)   Resp 17   Ht 1.524 m (5')   Wt (!) 44.3 kg (97 lb 10.6 oz)   SpO2 95%   BMI 19.07 kg/m²     Oxygen Saturation Interpretation: Normal    The cardiac monitor revealed sinus tachycardia with a heart rate in the 140s as interpreted by me. The cardiac monitor was ordered secondary to the patient's heart rate and to monitor the patient for dysrhythmia.       The patient’s available past medical records and past encounters were reviewed.          -----------------------DIAGNOSTIC RESULTS------------------------  LABS:    Labs Reviewed   CBC WITH AUTO DIFFERENTIAL - Abnormal       Result Value    WBC 11.0      nRBC 0.0      RBC 3.72 (*)     Hemoglobin 11.6 (*)     Hematocrit 35.7 (*)     MCV 96       MCH 31.2      MCHC 32.5      RDW 14.4      Platelets 281      Neutrophils % 81.9      Immature Granulocytes %, Automated 0.4      Lymphocytes % 8.2      Monocytes % 6.7      Eosinophils % 2.5      Basophils % 0.3      Neutrophils Absolute 8.99 (*)     Immature Granulocytes Absolute, Automated 0.04      Lymphocytes Absolute 0.90      Monocytes Absolute 0.74      Eosinophils Absolute 0.27      Basophils Absolute 0.03     COMPREHENSIVE METABOLIC PANEL - Abnormal    Glucose 173 (*)     Sodium 136      Potassium 3.8      Chloride 95 (*)     Bicarbonate 30      Urea Nitrogen 34 (*)     Creatinine 1.10      eGFR 47 (*)     Calcium 9.5      Albumin 3.1 (*)     Alkaline Phosphatase 91      Total Protein 6.7      AST 14      Bilirubin, Total 0.3      ALT 9      Anion Gap 11     URINALYSIS WITH REFLEX MICROSCOPIC - Abnormal    Color, Urine Light-Orange (*)     Appearance, Urine Ex.Turbid (*)     Specific Gravity, Urine 1.048 (*)     pH, Urine 8.0      Protein, Urine 50 (1+) (*)     Glucose, Urine Normal      Blood, Urine 0.06 (1+) (*)     Ketones, Urine NEGATIVE      Bilirubin, Urine NEGATIVE      Urobilinogen, Urine 2 (1+) (*)     Nitrite, Urine NEGATIVE      Leukocyte Esterase, Urine 500 Cole/µL (*)    N-TERMINAL PROBNP - Abnormal    PROBNP 4,319 (*)     Narrative:     Reference ranges are based on clinical submission data. These ranges represent the 95th percentile of normal cut-off points. As NT Pro- BNP values approach 1000 pg/ml, clinical symptoms are more likely associated with CHF.   SERIAL TROPONIN, INITIAL ([facility]) - Abnormal    Troponin T, High Sensitivity 51 (*)    TROPONIN T, HIGH SENSITIVITY - Abnormal    Troponin T, High Sensitivity 56 (*)    MICROSCOPIC ONLY, URINE - Abnormal    WBC, Urine 21-50 (*)     RBC, Urine 3-5      Squamous Epithelial Cells, Urine 10-25 (FEW)      Bacteria, Urine 4+ (*)     Mucus, Urine 2+     LIPASE - Normal    Lipase 18     PROTIME-INR - Normal    Protime 11.6      INR 1.1       Narrative:     INR Therapeutic Range: 2.0-3.5   MAGNESIUM - Normal    Magnesium 2.00     SARS-COV-2 PCR, SYMPTOMATIC - Normal    Coronavirus 2019, PCR Not Detected      Narrative:     This assay has received FDA Emergency Use Authorization (EUA) and is only authorized for the duration of time that circumstances exist to justify the authorization of the emergency use of in vitro diagnostic tests for the detection of SARS-CoV-2 virus and/or diagnosis of COVID-19 infection under section 564(b)(1) of the Act, 21 U.S.C. 360bbb-3(b)(1). This assay is an in vitro diagnostic nucleic acid amplification test for the qualitative detection of SARS-CoV-2 from nasopharyngeal specimens and has been validated for use at [facility]. Negative results do not preclude COVID-19 infections and should not be used as the sole basis for diagnosis, treatment, or other management decisions.     TROPONIN T SERIES, HIGH SENSITIVITY (0, 2 HR, 6 HR)    Narrative:     The following orders were created for panel order Troponin T Series, High Sensitivity (0, 2HR, 6HR).  Procedure                               Abnormality         Status                     ---------                               -----------         ------                     Serial Troponin, Initial...[[id number]]  Abnormal            Final result                 Please view results for these tests on the individual orders.       As interpreted by me, the above displayed labs are abnormal. All other labs obtained during this visit were within normal range or not returned as of this dictation.      EKG Interpretation    EKG per attending note no ST elevation or arrhythmia    CT angio chest for pulmonary embolism   Final Result   1. No pulmonary embolus.   2. No acute intrathoracic process.   3. Interval development of a large pulmonary mass measuring up to 6.9   cm in the lingula with likely invasion of the anterior chest wall and   2nd rib. Findings are  concerning for primary pulmonary malignancy and   correlation is recommended if desired tissue sampling may be obtained   for further evaluation.   4. Interval worsening and pancreatic ductal dilatation, previously   attributed to chronic pancreatitis.        Signed by: Fahad Howard 12/5/2023 4:01 PM   Dictation workstation:   [id number]              CT angio chest for pulmonary embolism   Final Result   1. No pulmonary embolus.   2. No acute intrathoracic process.   3. Interval development of a large pulmonary mass measuring up to 6.9   cm in the lingula with likely invasion of the anterior chest wall and   2nd rib. Findings are concerning for primary pulmonary malignancy and   correlation is recommended if desired tissue sampling may be obtained   for further evaluation.   4. Interval worsening and pancreatic ductal dilatation, previously   attributed to chronic pancreatitis.        Signed by: Fahad Howard 12/5/2023 4:01 PM   Dictation workstation:   [id number]              ------------------------------ ED COURSE/MEDICAL DECISION MAKING----------------------  Medical Decision Making:   Exam: A medically appropriate exam performed, outlined above, given the known history and presentation.    History obtained from: Review of medical record nursing home transfer forms nursing notes EMS report      Social Determinants of Health considered during this visit: Nursing home facility      PAST MEDICAL HISTORY/Chronic Conditions Affecting Care     has a past medical history of Atrial fibrillation (CMS/HCC), Chronic UTI, Diabetes mellitus, type II (CMS/HCC), Discontinued smoking (11/2005), Diverticulosis, DJD (degenerative joint disease), Esophageal reflux, Glucose intolerance, Hematuria, Hemorrhagic cystitis, History of cystocele, History of nuclear stress test (02/2017), Hyperlipidemia, Osteoporosis, Personal history of malignant neoplasm of other parts of uterus, Personal history of other specified conditions,  PMR (polymyalgia rheumatica) (CMS/HCC), Radiculopathy, Restless leg syndrome, Rheumatoid arthritis (CMS/HCC), Sensorineural hearing loss, bilateral, Spinal stenosis, Steroid-induced diabetes mellitus (CMS/HCC), and Uterine cancer (CMS/HCC).       CC/HPI Summary, Social Determinants of health, Records Reviewed, DDx, testing done/not done, ED Course, Reassessment, disposition considerations/shared decision making with patient, consults, disposition:   Patient presents with shortness of breath chest pain cough  Plan:  Tylenol  Normal saline  Chest CT-1. No pulmonary embolus.  2. No acute intrathoracic process.  3. Interval development of a large pulmonary mass measuring up to 6.9  cm in the lingula with likely invasion of the anterior chest wall and  2nd rib. Findings are concerning for primary pulmonary malignancy and  correlation is recommended if desired tissue sampling may be obtained  for further evaluation.  4. Interval worsening and pancreatic ductal dilatation, previously  attributed to chronic pancreatitis.      EKG  CBC  CMP  Lipase  Magnesium  proBNP  PT/INR  COVID  Troponin  Urine  CT chest 1. No pulmonary embolus.  2. No acute intrathoracic process.  3. Interval development of a large pulmonary mass measuring up to 6.9  cm in the lingula with likely invasion of the anterior chest wall and  2nd rib. Findings are concerning for primary pulmonary malignancy and  correlation is recommended if desired tissue sampling may be obtained  for further evaluation.  4. Interval worsening and pancreatic ductal dilatation, previously  attributed to chronic pancreatitis.    Medical Decision Making/Differential Diagnosis:  Differentials include not limited to pneumonia versus COVID flu versus electrolyte abnormality versus anemia versus PE versus mass versus acute coronary syndrome versus CHF versus rib fracture  Review:  Troponin 51 repeat troponin 56  proBNP 4319  Lipase 18  proBNP 4319  INR 1.1  White blood cell count  11  Hemoglobin 11.6  COVID-negative  Magnesium 2  Glucose 173  Chloride 95 otherwise electrolytes within normal limits  BUN 34 with creatinine 1.1  LFTs within normal limits  Urine urine showed leukocytes WBCs and bacteria consistent with UTI culture sent  Patient presented to the emergency room with complaints of hemoptysis shortness of breath intermittent chest pain.  Heart score was 7 however patient is denying any chest pain at this time.  Her troponins are 51 and 56.  EKG per attending note no ST elevation or arrhythmia noted.  Patient noted to have a mass on her  CT scan concerning for malignancy likely source of her chest discomfort and hemoptysis.  No elevation white blood cell count.  Lipase normal.  Mild anemia noted.  No signs of active bleeding.  COVID-negative.  Urine positive for UTI given Rocephin.  Discharged home on Keflex.  Patient was experiencing tachycardia.  Then started coughing with the brief episode of bradycardia that resulted to normal sinus rhythm.  Reports she has a cough that is difficult to express the drainage.  Patient has no further episodes.  CT was concerning for possible malignancy.  Discussed this with the patient family given referral to oncology.  Follow-up with her primary care physician.  Based on their clinical presentation history and symptoms consistent with lung mass hemoptysis shortness of breath anemia and cough  Patient discharged home with Tessalon Perles and Keflex for UTI.  Follow-up with primary care physician in 2 days for reevaluation follow-up with oncology return to the emerged part with any worsening symptoms or concerns patient family verbalized understanding patient discharged home patient was seen and evaluated with attending physician Dr. Jones  Discussed admission with patient and family.  Patient reports she wants to go back to the nursing home at this time.  PROCEDURES    Unless otherwise noted below, none      CONSULTS:   None      ED Course as of  12/06/23 0045   Tue Dec 05, 2023   1454 Patient episode of coughing close heart rate bradycardia down to 40 then passed back up to 135. [TB]   1502 Patient is sleeping heart rate 70 no acute distress [TB]      ED Course User Index  [TB] JACKELYN Orozco-CNP         Diagnoses as of 12/06/23 0045   Acute cystitis with hematuria   Anemia, unspecified type   Lung mass   Chest pain, unspecified type   Hemoptysis   Acute cough         This patient has remained hemodynamically stable during their ED course.      Critical Care: none       Counseling:  The emergency provider has spoken with the patient and family and discussed today’s results, in addition to providing specific details for the plan of care and counseling regarding the diagnosis and prognosis.  Questions are answered at this time and they are agreeable with the plan.         --------------------------------- IMPRESSION AND DISPOSITION ---------------------------------    IMPRESSION  1. Acute cystitis with hematuria    2. Anemia, unspecified type    3. Lung mass    4. Chest pain, unspecified type    5. Hemoptysis    6. Acute cough        DISPOSITION  Disposition: Discharge back to nursing home facility  Patient condition is stable improved no signs of respiratory distress        NOTE: This report was transcribed using voice recognition software. Every effort was made to ensure accuracy; however, inadvertent computerized transcription errors may be present      MANISH Orozco  12/06/23 0050

## 2023-12-06 ENCOUNTER — APPOINTMENT (OUTPATIENT)
Dept: PRIMARY CARE | Facility: CLINIC | Age: 88
End: 2023-12-06
Payer: MEDICARE

## 2023-12-06 ENCOUNTER — HOSPITAL ENCOUNTER (OUTPATIENT)
Dept: CARDIOLOGY | Facility: HOSPITAL | Age: 88
Discharge: HOME | End: 2023-12-06
Payer: MEDICARE

## 2023-12-06 LAB
ATRIAL RATE: 115 BPM
P AXIS: 96 DEGREES
PR INTERVAL: 152 MS
Q ONSET: 210 MS
QRS COUNT: 19 BEATS
QRS DURATION: 154 MS
QT INTERVAL: 396 MS
QTC CALCULATION(BAZETT): 547 MS
QTC FREDERICIA: 491 MS
R AXIS: -51 DEGREES
T AXIS: 122 DEGREES
T OFFSET: 408 MS
VENTRICULAR RATE: 115 BPM

## 2023-12-06 PROCEDURE — 93005 ELECTROCARDIOGRAM TRACING: CPT

## 2023-12-06 ASSESSMENT — HEART SCORE
HEART SCORE: 7
TROPONIN: GREATER THAN OR EQUAL TO 3 TIMES NORMAL LIMIT
AGE: 65+
HISTORY: MODERATELY SUSPICIOUS
ECG: NON-SPECIFIC REPOLARIZATION DISTURBANCE
RISK FACTORS: 1-2 RISK FACTORS

## 2023-12-06 NOTE — ED NOTES
Upon discharge, I offered to call for ambulance transportation back to [facility] but patient declined. She stated that her daughter is concerned about costs. Patient and daughter discussed this option when daughter came back into the room. Upon finding out that I could not give them an exact ETA for the ambulance but that there was a possibility of it taking up to an hour, the patient and daughter decided to leave via patient's daughter's vehicle.     Angela L Alesch, RN  12/05/23 2030

## 2023-12-06 NOTE — DISCHARGE INSTRUCTIONS
Follow-up with primary care physician in 2 days for reevaluation today it was noted that you have a lung mass at this point need follow-up with oncology  Increase fluids  Add cranberry juice to diet  Follow-up with primary care physician for urine cultures  Return to the emergency department if worsening symptoms or concerns  Tylenol for pain

## 2023-12-07 ENCOUNTER — NURSING HOME VISIT (OUTPATIENT)
Dept: POST ACUTE CARE | Facility: EXTERNAL LOCATION | Age: 88
End: 2023-12-07
Payer: MEDICARE

## 2023-12-07 DIAGNOSIS — N30.01 ACUTE CYSTITIS WITH HEMATURIA: ICD-10-CM

## 2023-12-07 DIAGNOSIS — R91.8 LUNG MASS: Primary | ICD-10-CM

## 2023-12-07 PROCEDURE — 99350 HOME/RES VST EST HIGH MDM 60: CPT

## 2023-12-08 DIAGNOSIS — R91.8 MASS OF LEFT LUNG: Primary | ICD-10-CM

## 2023-12-08 LAB — BACTERIA UR CULT: ABNORMAL

## 2023-12-09 ASSESSMENT — ENCOUNTER SYMPTOMS
RHINORRHEA: 0
HEMATURIA: 0
SHORTNESS OF BREATH: 0
CHEST TIGHTNESS: 0
CONFUSION: 0
SLEEP DISTURBANCE: 0
FEVER: 0
CONSTIPATION: 0
APPETITE CHANGE: 0
NAUSEA: 0
LIGHT-HEADEDNESS: 0
DIFFICULTY URINATING: 0
WEAKNESS: 1
AGITATION: 0
HEADACHES: 0
BLOOD IN STOOL: 0
FATIGUE: 1
NECK STIFFNESS: 1
VOMITING: 0
NERVOUS/ANXIOUS: 0
BACK PAIN: 1
COUGH: 1
PALPITATIONS: 0
ARTHRALGIAS: 1

## 2023-12-09 NOTE — PROGRESS NOTES
"Subjective   Patient ID: Brittany Santana is a 89 y.o. female who is being seen at [facility] for post acute ER visit, and discuss and answer questions regarding hospice services.    HPI   Pt visited in apartment at assisted living. Pt oriented x3, up in recliner, and denies pain at this time. Pt inquiring whether she should take Keflex for her UTI, or have trimethoprim that has been prescribed in past by urologist when she has a UTI. Daughter present for visit, and reported she thought pt had an allergy to Keflex. No allergies listed to Keflex in Epic. Assured daughter if patient had an allergy she wouldn't have been prescribed Keflex. The system would of \"flagged\" when prescriber wrote antibiotic. Daughter then stated \"I think mom got a dose in ER and was fine\". Confirmed with pt to take the Keflex as prescribed by ER, monitor for any adverse reactions/side effects, and if so, stop taking and call office if minor. Educated if any severe reactions occur notify nursing right away at facility. Daughter to go to [facility] to get prescription filled that was given by ER.    Discussed CT findings with patient. Pt stated \"what should I do, I think I know what I want, and that's to let the good lord take me when it's time\". Explained to pt I read she was referred to oncologist for biopsy of lung mass. If cancer she would be presented with treatment options if warranted. Pt states \"I wouldn't want treatment, and would still be in the same place as wanting hospice\". Pt and daughter with many questions regarding hospice care. Discussed in great detail and length about services, expectations, and goals of care. Pt wanting quality of life until her journey comes to a natural end. Pt states she would want [facility] as they took care of her  30 years ago when he passed. Patient emotional about recent findings regarding lung mass and discussing spouse's death. Pt stated \"I feel I " "have so much planning to do and list to take care of,  I need to decide on a  home, and sell my house\". \"I want a [employer]rian[employer] burial, and a mass without eulogies\". Comfort and support provided. All questions answered for both patient and daughter.     TC made to son Zay and discussed visit. Zay will be up from [place] next week and family/patient already have an appointment with [facility] for  around 12:30 pm. Zay thankful for update.       Review of Systems   Constitutional:  Positive for fatigue. Negative for appetite change and fever.   HENT:  Positive for congestion. Negative for rhinorrhea.         Pt with reports of coughing up blood today, and mucous in back of throat.    Eyes:  Negative for visual disturbance.   Respiratory:  Positive for cough. Negative for chest tightness and shortness of breath.    Cardiovascular:  Negative for chest pain, palpitations and leg swelling.   Gastrointestinal:  Negative for blood in stool, constipation, nausea and vomiting.   Genitourinary:  Negative for difficulty urinating and hematuria.   Musculoskeletal:  Positive for arthralgias, back pain and neck stiffness.   Skin:  Positive for pallor.   Neurological:  Positive for weakness. Negative for light-headedness and headaches.   Psychiatric/Behavioral:  Negative for agitation, confusion and sleep disturbance. The patient is not nervous/anxious.        Objective   There were no vitals taken for this visit.    Physical Exam  Constitutional:       General: She is awake.      Appearance: She is underweight. She is not ill-appearing.   HENT:      Head: Normocephalic.      Comments: Dark circles under bilateral eyes     Nose: Nose normal.      Mouth/Throat:      Mouth: Mucous membranes are moist.      Comments: Pt states she feels mucous in her throat.   Eyes:      Conjunctiva/sclera: Conjunctivae normal.      Pupils: Pupils are equal, round, and reactive to light.      Comments: Wears " glasses   Pulmonary:      Effort: Pulmonary effort is normal.      Breath sounds: No decreased breath sounds.   Musculoskeletal:         General: Normal range of motion.      Cervical back: Normal range of motion.      Right lower leg: No edema.      Left lower leg: No edema.      Comments: ROM at baseline   Skin:     General: Skin is warm.      Capillary Refill: Capillary refill takes less than 2 seconds.      Findings: Bruising present.      Comments: eccymosis to right shoulder   Neurological:      General: No focal deficit present.      Mental Status: She is alert and oriented to person, place, and time. Mental status is at baseline.   Psychiatric:         Mood and Affect: Mood normal.         Behavior: Behavior normal. Behavior is cooperative.         Thought Content: Thought content normal.         Judgment: Judgment normal.         Assessment/Plan   Diagnoses and all orders for this visit:  Lung mass  Comments:  Pt is choosing [facility] vs evaluation and treatment by oncologist.Referral placed at facility.  Acute cystitis with hematuria  Comments:  Continue on Keflex as prescribed by ER for UTI with hematuria.  Other orders  -     DNR Comfort Measures Only

## 2023-12-11 ENCOUNTER — CLINICAL SUPPORT (OUTPATIENT)
Dept: AUDIOLOGY | Facility: CLINIC | Age: 88
End: 2023-12-11
Payer: MEDICARE

## 2023-12-11 DIAGNOSIS — H90.3 SENSORINEURAL HEARING LOSS (SNHL) OF BOTH EARS: Primary | ICD-10-CM

## 2023-12-11 PROCEDURE — HRANC PR HEARING AID NO CHARGE: Performed by: AUDIOLOGIST

## 2023-12-11 NOTE — PROGRESS NOTES
HEARING AID CHECK      RIGHT: PHONAK AUDEO L 30 RT WITH C SHELL SKELETON CANAL LOCK M  SN: [id number]  LEFT: PHONAK AUDEO L 30 RT WITH C SHELL SKELETON CANAL LOCK M  SN: [id number]  FIT DATE:2/2/2023  WARRANTY:2/02/26  TODAY :   12/11/23 ONE YEAR CHECK  TODAY: 6/28/23    WEARING AIDS WELL AND RAISED GAIN 3 OVERALL BUT PATIENT IS HAPPY WITH AIDS AND SOUND QUALITY. SHE IS IN AN ASSISTED LIVING AND VERY FRAIL TODAY.  WEARING AIDS AT LEAST 10 HOURS. CLEANED AND CHECKED AND WILL SEE IN 6 MONTHS AND DO REAL EA WHEN FEELING MORE ABLE TO SIT.        OTOSCOPY:  CLEAR IN BOTH EARS.    The following programming changes were made: SEE ABOVE     The patient paid N/C as is one year check  for today's visit.  Return in 6 months or sooner if needed.    APPOINTMENT TIME: 30 minutes

## 2023-12-13 ENCOUNTER — APPOINTMENT (OUTPATIENT)
Dept: HEMATOLOGY/ONCOLOGY | Facility: CLINIC | Age: 88
End: 2023-12-13
Payer: MEDICARE